# Patient Record
Sex: FEMALE | Race: WHITE | NOT HISPANIC OR LATINO
[De-identification: names, ages, dates, MRNs, and addresses within clinical notes are randomized per-mention and may not be internally consistent; named-entity substitution may affect disease eponyms.]

---

## 2021-09-15 ENCOUNTER — TRANSCRIPTION ENCOUNTER (OUTPATIENT)
Age: 61
End: 2021-09-15

## 2023-03-09 PROBLEM — Z00.00 ENCOUNTER FOR PREVENTIVE HEALTH EXAMINATION: Status: ACTIVE | Noted: 2023-03-09

## 2023-03-13 ENCOUNTER — APPOINTMENT (OUTPATIENT)
Dept: ORTHOPEDIC SURGERY | Facility: CLINIC | Age: 63
End: 2023-03-13
Payer: COMMERCIAL

## 2023-03-13 VITALS — HEIGHT: 65 IN | BODY MASS INDEX: 27.99 KG/M2 | WEIGHT: 168 LBS

## 2023-03-13 DIAGNOSIS — Z78.9 OTHER SPECIFIED HEALTH STATUS: ICD-10-CM

## 2023-03-13 DIAGNOSIS — S16.1XXA STRAIN OF MUSCLE, FASCIA AND TENDON AT NECK LEVEL, INITIAL ENCOUNTER: ICD-10-CM

## 2023-03-13 PROCEDURE — 73030 X-RAY EXAM OF SHOULDER: CPT | Mod: 50

## 2023-03-13 PROCEDURE — 72040 X-RAY EXAM NECK SPINE 2-3 VW: CPT

## 2023-03-13 PROCEDURE — 99203 OFFICE O/P NEW LOW 30 MIN: CPT

## 2023-03-13 RX ORDER — BUDESONIDE AND FORMOTEROL FUMARATE DIHYDRATE 80; 4.5 UG/1; UG/1
80-4.5 AEROSOL RESPIRATORY (INHALATION)
Refills: 0 | Status: ACTIVE | COMMUNITY

## 2023-03-13 RX ORDER — AZELASTINE HYDROCHLORIDE 137 UG/1
SPRAY, METERED NASAL
Refills: 0 | Status: ACTIVE | COMMUNITY

## 2023-03-13 RX ORDER — BUDESONIDE AND FORMOTEROL FUMARATE DIHYDRATE 160; 4.5 UG/1; UG/1
160-4.5 AEROSOL RESPIRATORY (INHALATION)
Refills: 0 | Status: ACTIVE | COMMUNITY

## 2023-03-13 RX ORDER — FLUTICASONE PROPIONATE 50 UG/1
50 SPRAY, METERED NASAL
Refills: 0 | Status: ACTIVE | COMMUNITY

## 2023-03-13 RX ORDER — DIAZEPAM 5 MG/1
5 TABLET ORAL
Refills: 0 | Status: ACTIVE | COMMUNITY

## 2023-03-13 NOTE — REASON FOR VISIT
[FreeTextEntry2] : Pain in both shoulders over the past 6 months, left greater than right\par Mild neck pain and stiffness

## 2023-03-13 NOTE — DISCUSSION/SUMMARY
[de-identified] : The patient will have new MRIs both shoulders\par Moist heat to her neck p.r.n.\par Ice to her shoulders p.r.n.\par Continue ibuprofen p.r.n.\par She is referred to \[facility]\"" PT for therapy program 2-3 times a week\par She will stop irritating activities and exercises\par I discussed possibly trying Medrol Dosepak as well as possible cortisone injections\par \par Impression:\par Cervical strain/spondylosis\par Right shoulder impingement\par Left shoulder impingement

## 2023-03-13 NOTE — DATA REVIEWED
[MRI] : MRI [Bilateral] : of the bilateral [Report was reviewed and noted in the chart] : The report was reviewed and noted in the chart [FreeTextEntry1] : MRI right shoulder done at Dr. Arriaga April 27, 2018 reported is supraspinatus tendinosis with minor fraying and articular aspect and intrasubstance delamination.  Mild infraspinatus tendinosis.  AC joint arthritis.  Mild subacromial bursitis\par \par MRI left shoulder done at Dr. Arriaga November 29, 2019 reported as supraspinatus tendinosis with mild fraying of articular surface.  Moderate degeneration of the AC joint.  Mild truncation free edge posterior superior glenoid labrum
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
## 2023-03-13 NOTE — IMAGING
[Bilateral] : shoulder bilaterally [de-identified] : Cervical spine\par Inspection normal\par Mildly decreased active range of motion\par Mild tenderness trapezius bilaterally\par -negative foraminal closing test bilaterally\par Motor exam upper extremities-supraspinatus strength 5 minus/5 bilaterally, remainder normal\par \par Right shoulder\par No swelling\par Slight tenderness anterior acromion\par Full active range of motion\par Positive impingement 170°\par Radial pulse 2+ \par \par Left shoulder\par No swelling\par Mild tenderness anterior acromion\par Full active range of motion\par Positive impingement 160°\par Radial pulse 2+\par  [FreeTextEntry1] : Reviewed and interpreted.  Right shoulder external rotation and outlet views-Type I-II acromion.  Moderate to severe degenerative changes the AC joint\par \par Reviewed and interpreted.  Left shoulder external rotation and outlet views-Type I-II acromion.  Moderate to severe degenerative changes of the AC joint

## 2023-03-23 ENCOUNTER — APPOINTMENT (OUTPATIENT)
Dept: ORTHOPEDIC SURGERY | Facility: CLINIC | Age: 63
End: 2023-03-23
Payer: COMMERCIAL

## 2023-03-23 VITALS — HEIGHT: 65 IN | WEIGHT: 168 LBS | BODY MASS INDEX: 27.99 KG/M2

## 2023-03-23 DIAGNOSIS — R93.6 ABNORMAL FINDINGS ON DIAGNOSTIC IMAGING OF LIMBS: ICD-10-CM

## 2023-03-23 DIAGNOSIS — M47.812 SPONDYLOSIS W/OUT MYELOPATHY OR RADICULOPATHY, CERVICAL REGION: ICD-10-CM

## 2023-03-23 DIAGNOSIS — M75.51 BURSITIS OF RIGHT SHOULDER: ICD-10-CM

## 2023-03-23 DIAGNOSIS — M75.111 INCOMPLETE ROTATOR CUFF TEAR OR RUPTURE OF RIGHT SHOULDER, NOT SPECIFIED AS TRAUMATIC: ICD-10-CM

## 2023-03-23 DIAGNOSIS — M75.42 IMPINGEMENT SYNDROME OF LEFT SHOULDER: ICD-10-CM

## 2023-03-23 DIAGNOSIS — M75.41 IMPINGEMENT SYNDROME OF RIGHT SHOULDER: ICD-10-CM

## 2023-03-23 DIAGNOSIS — S16.1XXD STRAIN OF MUSCLE, FASCIA AND TENDON AT NECK LEVEL, SUBSEQUENT ENCOUNTER: ICD-10-CM

## 2023-03-23 DIAGNOSIS — M19.011 PRIMARY OSTEOARTHRITIS, RIGHT SHOULDER: ICD-10-CM

## 2023-03-23 DIAGNOSIS — M75.52 BURSITIS OF LEFT SHOULDER: ICD-10-CM

## 2023-03-23 PROCEDURE — 99214 OFFICE O/P EST MOD 30 MIN: CPT

## 2023-03-23 NOTE — HISTORY OF PRESENT ILLNESS
[Gradual] : gradual [6] : 6 [Dull/Aching] : dull/aching [Constant] : constant [Meds] : meds [Full time] : Work status: full time [de-identified] : The patient has continued mild pain and stiffness in her neck with movement.  Her neck feels sore\par She has continued pain in both shoulders.  The left shoulder bothers her more than the right.  She has mild to moderate pain reaching above her head behind her.  Occasional awakening from sleep at night.  No chest pain or shortness of breath.  She had MRIs of both her shoulders. [FreeTextEntry7] : B Arms [] : Post Surgical Visit: no [FreeTextEntry9] : Ibuprofen and Tylenol [de-identified] : 03/13/23 [de-identified] : Dr. Tan [de-identified] : MRI

## 2023-03-23 NOTE — IMAGING
[de-identified] : Cervical spine\par Inspection normal\par Mildly decreased active range of motion\par Mild tenderness trapezius bilaterally\par -negative foraminal closing test bilaterally\par Motor exam upper extremities-supraspinatus strength 5 minus/5 bilaterally, remainder normal\par \par Right shoulder\par No swelling\par Slight tenderness anterior acromion\par Full active range of motion\par Positive impingement 170°\par Radial pulse 2+ \par \par Left shoulder\par No swelling\par Mild tenderness anterior acromion\par Full active range of motion\par Positive impingement 160°\par Radial pulse 2+\par

## 2023-03-23 NOTE — DISCUSSION/SUMMARY
[de-identified] : MRIs of both shoulders were discussed with the patient\par Moist heat to her neck p.r.n.\par Ice to her shoulders p.r.n.\par Continue ibuprofen p.r.n.\par She is referred to [facility] for therapy program 2-3 times a week\par She will stop irritating activities and exercises\par I discussed possible cortisone injections\par \par Impression:\par Cervical strain/spondylosis\par Right shoulder impingement/mild osteoarthritis/partial rotator cuff tear\par Left shoulder impingement

## 2023-03-23 NOTE — DATA REVIEWED
[MRI] : MRI [Bilateral] : of the bilateral [Shoulder] : shoulder [I independently reviewed and interpreted images and report] : I independently reviewed and interpreted images and report [FreeTextEntry1] : MRI right shoulder done at [facility] March 17, 2023 was reviewed. There are moderate increase signal changes in the supraspinatus tendon with partial articular surface tear. Reported as unchanged from prior MRI dated April 27, 2018. There are degenerative changes of the glenohumeral joint with subchondral cyst formation posterior glenoid. Moderate degenerative changes of the AC joint\par \par MRI left shoulder done at [facility] March 17, 2023 was reviewed. There are mild to moderate increased signal changes in the supra tendinitis and infraspinatus tendons. The rotator cuff is intact. Mild increased signal changes proximal biceps tendon. Degenerative changes posterior labrum. Mild degenerative changes of the glenohumeral joint. Moderate degenerative changes of the AC joint. Mild subacromial bursitis \par

## 2023-05-04 ENCOUNTER — APPOINTMENT (OUTPATIENT)
Dept: ORTHOPEDIC SURGERY | Facility: CLINIC | Age: 63
End: 2023-05-04

## 2025-01-22 ENCOUNTER — APPOINTMENT (OUTPATIENT)
Dept: ORTHOPEDIC SURGERY | Facility: CLINIC | Age: 65
End: 2025-01-22
Payer: COMMERCIAL

## 2025-01-22 VITALS — WEIGHT: 168 LBS | BODY MASS INDEX: 27.99 KG/M2 | HEIGHT: 65 IN

## 2025-01-22 DIAGNOSIS — M75.111 INCOMPLETE ROTATOR CUFF TEAR OR RUPTURE OF RIGHT SHOULDER, NOT SPECIFIED AS TRAUMATIC: ICD-10-CM

## 2025-01-22 DIAGNOSIS — M19.011 PRIMARY OSTEOARTHRITIS, RIGHT SHOULDER: ICD-10-CM

## 2025-01-22 DIAGNOSIS — M47.812 SPONDYLOSIS W/OUT MYELOPATHY OR RADICULOPATHY, CERVICAL REGION: ICD-10-CM

## 2025-01-22 DIAGNOSIS — M75.42 IMPINGEMENT SYNDROME OF LEFT SHOULDER: ICD-10-CM

## 2025-01-22 DIAGNOSIS — M75.41 IMPINGEMENT SYNDROME OF RIGHT SHOULDER: ICD-10-CM

## 2025-01-22 DIAGNOSIS — M75.52 BURSITIS OF LEFT SHOULDER: ICD-10-CM

## 2025-01-22 DIAGNOSIS — S16.1XXD STRAIN OF MUSCLE, FASCIA AND TENDON AT NECK LEVEL, SUBSEQUENT ENCOUNTER: ICD-10-CM

## 2025-01-22 PROCEDURE — 72040 X-RAY EXAM NECK SPINE 2-3 VW: CPT

## 2025-01-22 PROCEDURE — 73030 X-RAY EXAM OF SHOULDER: CPT | Mod: 50

## 2025-01-22 PROCEDURE — 99213 OFFICE O/P EST LOW 20 MIN: CPT | Mod: 25

## 2025-01-22 RX ORDER — BENRALIZUMAB 10 MG/.5ML
10 INJECTION, SOLUTION SUBCUTANEOUS
Refills: 0 | Status: ACTIVE | COMMUNITY

## 2025-02-03 ENCOUNTER — APPOINTMENT (OUTPATIENT)
Dept: MRI IMAGING | Facility: CLINIC | Age: 65
End: 2025-02-03
Payer: COMMERCIAL

## 2025-02-03 PROCEDURE — 73221 MRI JOINT UPR EXTREM W/O DYE: CPT | Mod: LT

## 2025-02-03 PROCEDURE — 73221 MRI JOINT UPR EXTREM W/O DYE: CPT | Mod: RT

## 2025-02-04 ENCOUNTER — NON-APPOINTMENT (OUTPATIENT)
Age: 65
End: 2025-02-04

## 2025-02-18 ENCOUNTER — APPOINTMENT (OUTPATIENT)
Dept: ORTHOPEDIC SURGERY | Facility: CLINIC | Age: 65
End: 2025-02-18
Payer: COMMERCIAL

## 2025-02-18 DIAGNOSIS — M47.812 SPONDYLOSIS W/OUT MYELOPATHY OR RADICULOPATHY, CERVICAL REGION: ICD-10-CM

## 2025-02-18 DIAGNOSIS — M75.41 IMPINGEMENT SYNDROME OF RIGHT SHOULDER: ICD-10-CM

## 2025-02-18 DIAGNOSIS — M75.52 BURSITIS OF LEFT SHOULDER: ICD-10-CM

## 2025-02-18 DIAGNOSIS — M75.51 BURSITIS OF RIGHT SHOULDER: ICD-10-CM

## 2025-02-18 DIAGNOSIS — M77.11 LATERAL EPICONDYLITIS, RIGHT ELBOW: ICD-10-CM

## 2025-02-18 DIAGNOSIS — S16.1XXD STRAIN OF MUSCLE, FASCIA AND TENDON AT NECK LEVEL, SUBSEQUENT ENCOUNTER: ICD-10-CM

## 2025-02-18 DIAGNOSIS — M19.011 PRIMARY OSTEOARTHRITIS, RIGHT SHOULDER: ICD-10-CM

## 2025-02-18 PROCEDURE — 73080 X-RAY EXAM OF ELBOW: CPT | Mod: RT

## 2025-02-18 PROCEDURE — 99214 OFFICE O/P EST MOD 30 MIN: CPT | Mod: 25

## 2025-05-20 ENCOUNTER — APPOINTMENT (OUTPATIENT)
Dept: ORTHOPEDIC SURGERY | Facility: CLINIC | Age: 65
End: 2025-05-20

## 2025-08-21 ENCOUNTER — APPOINTMENT (OUTPATIENT)
Dept: ORTHOPEDIC SURGERY | Facility: CLINIC | Age: 65
End: 2025-08-21
Payer: COMMERCIAL

## 2025-08-21 VITALS
WEIGHT: 168 LBS | DIASTOLIC BLOOD PRESSURE: 76 MMHG | HEART RATE: 78 BPM | BODY MASS INDEX: 27.99 KG/M2 | TEMPERATURE: 94 F | HEIGHT: 65 IN | SYSTOLIC BLOOD PRESSURE: 131 MMHG | OXYGEN SATURATION: 95 %

## 2025-08-21 DIAGNOSIS — M54.50 LOW BACK PAIN, UNSPECIFIED: ICD-10-CM

## 2025-08-21 PROCEDURE — 99213 OFFICE O/P EST LOW 20 MIN: CPT

## 2025-08-21 RX ORDER — DICLOFENAC SODIUM AND MISOPROSTOL 75; 200 MG/1; UG/1
75-0.2 TABLET, DELAYED RELEASE ORAL TWICE DAILY
Qty: 60 | Refills: 0 | Status: ACTIVE | COMMUNITY
Start: 2025-08-21 | End: 1900-01-01

## 2025-09-04 ENCOUNTER — APPOINTMENT (OUTPATIENT)
Dept: ORTHOPEDIC SURGERY | Facility: CLINIC | Age: 65
End: 2025-09-04
Payer: COMMERCIAL

## 2025-09-04 DIAGNOSIS — M48.061 SPINAL STENOSIS, LUMBAR REGION WITHOUT NEUROGENIC CLAUDICATION: ICD-10-CM

## 2025-09-04 PROCEDURE — 99214 OFFICE O/P EST MOD 30 MIN: CPT

## 2025-09-04 RX ORDER — DICLOFENAC SODIUM 75 MG/1
75 TABLET, DELAYED RELEASE ORAL
Qty: 60 | Refills: 0 | Status: ACTIVE | COMMUNITY
Start: 2025-09-04 | End: 1900-01-01